# Patient Record
Sex: MALE | Race: WHITE | NOT HISPANIC OR LATINO | URBAN - METROPOLITAN AREA
[De-identification: names, ages, dates, MRNs, and addresses within clinical notes are randomized per-mention and may not be internally consistent; named-entity substitution may affect disease eponyms.]

---

## 2018-01-26 ENCOUNTER — IMPORTED ENCOUNTER (OUTPATIENT)
Dept: URBAN - METROPOLITAN AREA CLINIC 50 | Facility: CLINIC | Age: 78
End: 2018-01-26

## 2018-03-27 ENCOUNTER — IMPORTED ENCOUNTER (OUTPATIENT)
Dept: URBAN - METROPOLITAN AREA CLINIC 50 | Facility: CLINIC | Age: 78
End: 2018-03-27

## 2018-03-27 NOTE — PATIENT DISCUSSION
"""Informed patient that their cataract OS is visually significant and meets the criteria for cataract surgery to increase their vision and decrease their glare symptoms.  Order scans for IOL measurements

## 2019-07-18 NOTE — PROCEDURE NOTE: CLINICAL
PROCEDURE NOTE: Focal Laser, Retina #1 OD. Diagnosis: Diabetic Macular Edema. Anesthesia: Topical. Prior to focal laser, risks/benefits/alternatives to laser discussed including loss of vision and patient wished to proceed. An informed consent was obtained and no assurances or guarantees were given. Spot size: 100* um. Power: 90* mW. Number of pulses: 31*. Pulse duration: 70* ms. Procedure Time:956AM *. Patient tolerated procedure well. There were no complications. Post procedure instructions given. Patient given office phone number/answering service number and advised to call immediately should there be loss of vision or pain, or should they have any other questions or concerns. Lydia Barr

## 2019-08-09 NOTE — PROCEDURE NOTE: CLINICAL
PROCEDURE NOTE: Focal Laser, Retina OS. Diagnosis: Diabetic Macular Edema. Anesthesia: Topical. Prior to focal laser, risks/benefits/alternatives to laser discussed including loss of vision and patient wished to proceed. An informed consent was obtained and no assurances or guarantees were given. Spot size: 100* um. Power: 60* mW. Number of pulses: 57*. Pulse duration: 70* ms. Procedure Time: 211PM*. Patient tolerated procedure well. There were no complications. Post procedure instructions given. Patient given office phone number/answering service number and advised to call immediately should there be loss of vision or pain, or should they have any other questions or concerns. Veronica Stern

## 2020-11-20 NOTE — PATIENT DISCUSSION
11/20/20 INCREASED IOP, ON ALPHAGAN TID, , TIMOLOL/DORZOLAMIDE TID, WILL ADD JANE LARSON , SEE COAG SPECIALIST.

## 2020-12-04 NOTE — PATIENT DISCUSSION
- New CRx dispensed today to use full time for reading.  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

## 2021-01-15 NOTE — PROCEDURE NOTE: CLINICAL
PROCEDURE NOTE: Avastin 1.25mg #1 OD. Diagnosis: Diabetic Macular Edema. Anesthesia: Subconjunctival. Prep: Betadine Drops. Prior to injection, risks/benefits/alternatives discussed including infection, loss of vision, hemorrhage, cataract, glaucoma, retinal tears or detachment. The off-label status of Intravitreal Avastin also was reviewed. The patient wished to proceed with treatment. Topical anesthesia was induced with Alcaine. Additional anesthesia was achieved using drop(s) or injection checked above. A drop of Povidone-iodine 5% ophthalmic solution was instilled over the injection site and in the inferior fornix. Betadine prep was performed. Using the syringe provided, Avastin 1.25 mg in 0.05 cc was injected into the vitreous cavity. The needle was passed 3.0 mm posterior to the limbus in pseudophakic patients, and 3.5 mm posterior to the limbus in phakic patients. Patient tolerated procedure well. There were no complications. Injection time: 5:25PM. Lot #: [id number]. Expiration Date: 3/13/2021. Post-op instructions given. Inventory Id: null. The patient was instructed to return for re-evaluation in approximately 4-12 weeks depending on his/her condition and was told to call immediately if vision decreases and/or if his/her eye becomes red, painful, and/or light sensitive. The patient was instructed to go to the emergency room or call 911 if unable to reach the doctor within an hour or two of trying or calling. The patient was instructed to use Artificial Tears q.i.d. p.r.n for comfort. Castillo Lazo

## 2021-02-19 NOTE — PROCEDURE NOTE: CLINICAL
PROCEDURE NOTE: Avastin 1.25mg OD. Diagnosis: Diabetic Macular Edema. Anesthesia: Subconjunctival. Prep: Betadine Drops. Prior to injection, risks/benefits/alternatives discussed including infection, loss of vision, hemorrhage, cataract, glaucoma, retinal tears or detachment. The off-label status of Intravitreal Avastin also was reviewed. The patient wished to proceed with treatment. Topical anesthesia was induced with Alcaine. Additional anesthesia was achieved using drop(s) or injection checked above. A drop of Povidone-iodine 5% ophthalmic solution was instilled over the injection site and in the inferior fornix. Betadine prep was performed. Using the syringe provided, Avastin 1.25 mg in 0.05 cc was injected into the vitreous cavity. The needle was passed 3.0 mm posterior to the limbus in pseudophakic patients, and 3.5 mm posterior to the limbus in phakic patients. Patient tolerated procedure well. There were no complications. Injection time: *. Lot #: [email or web address]. Expiration Date: 4/6/2021. Post-op instructions given. Inventory Id: null. The patient was instructed to return for re-evaluation in approximately 4-12 weeks depending on his/her condition and was told to call immediately if vision decreases and/or if his/her eye becomes red, painful, and/or light sensitive. The patient was instructed to go to the emergency room or call 911 if unable to reach the doctor within an hour or two of trying or calling. The patient was instructed to use Artificial Tears q.i.d. p.r.n for comfort. [facility]

## 2021-02-19 NOTE — PATIENT DISCUSSION
After Visit Summary   11/17/2017    Amna Hickman    MRN: [id number]           Patient Information     Date Of Birth          1956        Visit Information        Provider Department      11/17/2017 12:30 PM Wilber Greer MD [facility]        Today's Diagnoses     Need for prophylactic vaccination and inoculation against influenza    -  1    Routine general medical examination at a health care facility        Nonintractable migraine, unspecified migraine type          Care Instructions      Preventive Health Recommendations  Female Ages 50 - 64    Yearly exam: See your health care provider every year in order to  o Review health changes.   o Discuss preventive care.    o Review your medicines if your doctor has prescribed any.      Get a Pap test every three years (unless you have an abnormal result and your provider advises testing more often).    If you get Pap tests with HPV test, you only need to test every 5 years, unless you have an abnormal result.     You do not need a Pap test if your uterus was removed (hysterectomy) and you have not had cancer.    You should be tested each year for STDs (sexually transmitted diseases) if you're at risk.     Have a mammogram every 1 to 2 years.    Have a colonoscopy at age 50, or have a yearly FIT test (stool test). These exams screen for colon cancer.      Have a cholesterol test every 5 years, or more often if advised.    Have a diabetes test (fasting glucose) every three years. If you are at risk for diabetes, you should have this test more often.     If you are at risk for osteoporosis (brittle bone disease), think about having a bone density scan (DEXA).    Shots: Get a flu shot each year. Get a tetanus shot every 10 years.    Nutrition:     Eat at least 5 servings of fruits and vegetables each day.    Eat whole-grain bread, whole-wheat pasta and brown rice instead of white grains and rice.    Talk to your provider about  ARTIFICIAL TEARS to affected eye(s) as needed. "Calcium and Vitamin D.     Lifestyle    Exercise at least 150 minutes a week (30 minutes a day, 5 days a week). This will help you control your weight and prevent disease.    Limit alcohol to one drink per day.    No smoking.     Wear sunscreen to prevent skin cancer.     See your dentist every six months for an exam and cleaning.    See your eye doctor every 1 to 2 years.            Follow-ups after your visit        Who to contact     If you have questions or need follow up information about today's clinic visit or your schedule please contact [facility] directly at [phone].  Normal or non-critical lab and imaging results will be communicated to you by MyQuoteApphart, letter or phone within 4 business days after the clinic has received the results. If you do not hear from us within 7 days, please contact the clinic through [email or web address] or phone. If you have a critical or abnormal lab result, we will notify you by phone as soon as possible.  Submit refill requests through [facility]ik or call your pharmacy and they will forward the refill request to us. Please allow 3 business days for your refill to be completed.          Additional Information About Your Visit        MyChart Information     [facility]ik lets you send messages to your doctor, view your test results, renew your prescriptions, schedule appointments and more. To sign up, go to [email or web address] . Click on \"Log in\" on the left side of the screen, which will take you to the Welcome page. Then click on \"Sign up Now\" on the right side of the page.     You will be asked to enter the access code listed below, as well as some personal information. Please follow the directions to create your username and password.     Your access code is: [email or web address]  Expires: 2/15/2018 12:38 PM     Your access code will  in 90 days. If you need help or a new code, please call your [facility] or [phone].        Care EveryWhere ID     This is your Care " "EveryWhere ID. This could be used by other organizations to access your [facility] medical records  [id number]        Your Vitals Were     Pulse Temperature Height Pulse Oximetry Breastfeeding? BMI (Body Mass Index)    57 97  F (36.1  C) (Oral) 5' 2.5\" (1.588 m) 98% No 22.68 kg/m2       Blood Pressure from Last 3 Encounters:   11/17/17 118/73   11/04/16 112/72   08/04/15 116/68    Weight from Last 3 Encounters:   11/17/17 126 lb (57.2 kg)   11/04/16 122 lb 4.8 oz (55.5 kg)   08/04/15 120 lb 6.4 oz (54.6 kg)              We Performed the Following     FLU VAC, SPLIT VIRUS IM > 3 YO (QUADRIVALENT) [55015]     Vaccine Administration, Initial [85904]          Today's Medication Changes          These changes are accurate as of: 11/17/17 12:38 PM.  If you have any questions, ask your nurse or doctor.               Stop taking these medicines if you haven't already. Please contact your care team if you have questions.     estrogens conjugated (synthetic A) 0.3 MG tablet   Commonly known as:  CENESTIN   Stopped by:  Wilber Greer MD           VIVELLE-DOT 0.1 MG/24HR BIW patch   Generic drug:  estradiol   Stopped by:  Wilber Greer MD                Where to get your medicines      These medications were sent to [facility] [address] [place] [address] & Market  [address] [place] [id number]     Phone:  [phone]     frovatriptan 2.5 MG tablet                Primary Care Provider Office Phone # Fax #    Wilber Greer [phone] [address] [address]        Equal Access to Services     [facility] : Gabo Soto, waaxda luqadaha, qaybta kaalmada bárbara, eunice falcon. [facility] [phone].    ATENCIÓN: Si habla español, tiene a levine disposición servicios gratuitos de asistencia lingüística. Llame al [phone].    We comply with applicable federal civil rights " laws and [place] laws. We do not discriminate on the basis of race, color, national origin, age, disability, sex, sexual orientation, or gender identity.            Thank you!     Thank you for choosing [facility]  for your care. Our goal is always to provide you with excellent care. Hearing back from our patients is one way we can continue to improve our services. Please take a few minutes to complete the written survey that you may receive in the mail after your visit with us. Thank you!             Your Updated Medication List - Protect others around you: Learn how to safely use, store and throw away your medicines at www.disposemymeds.org.          This list is accurate as of: 11/17/17 12:38 PM.  Always use your most recent med list.                   Brand Name Dispense Instructions for use Diagnosis    frovatriptan 2.5 MG tablet    FROVA    21 tablet    Take 1 tablet (2.5 mg) by  mouth at onset of headache    Nonintractable migraine, unspecified migraine type       zolpidem 5 MG tablet    AMBIEN    15 tablet    Take 1 tablet (5 mg) by mouth nightly as needed for sleep    Transient insomnia

## 2021-04-17 ASSESSMENT — VISUAL ACUITY
OD_BAT: 20/30
OS_CC: J1
OD_OTHER: 20/30. 20/40.
OS_OTHER: 20/80.
OD_CC: 20/25
OD_CC: J1
OS_BAT: 20/80
OS_CC: 20/40

## 2021-04-17 ASSESSMENT — TONOMETRY
OD_IOP_MMHG: 15
OS_IOP_MMHG: 15

## 2021-04-20 NOTE — PROCEDURE NOTE: CLINICAL
PROCEDURE NOTE: Avastin 1.25mg OD. Diagnosis: Diabetic Macular Edema. Anesthesia: Lidocaine 4%. Prep: Betadine Drops. Prior to injection, risks/benefits/alternatives discussed including infection, loss of vision, hemorrhage, cataract, glaucoma, retinal tears or detachment. The off-label status of Intravitreal Avastin also was reviewed. The patient wished to proceed with treatment. Topical anesthesia was induced with Alcaine. Additional anesthesia was achieved using drop(s) or injection checked above. A drop of Povidone-iodine 5% ophthalmic solution was instilled over the injection site and in the inferior fornix. Betadine prep was performed. Using the syringe provided, Avastin 1.25 mg in 0.05 cc was injected into the vitreous cavity. The needle was passed 3.0 mm posterior to the limbus in pseudophakic patients, and 3.5 mm posterior to the limbus in phakic patients. Patient tolerated procedure well. There were no complications. Injection time: 340. Lot #: [email or web address]. Expiration Date: 7/1/2021. Post-op instructions given. Inventory Id: null. The patient was instructed to return for re-evaluation in approximately 4-12 weeks depending on his/her condition and was told to call immediately if vision decreases and/or if his/her eye becomes red, painful, and/or light sensitive. The patient was instructed to go to the emergency room or call 911 if unable to reach the doctor within an hour or two of trying or calling. The patient was instructed to use Artificial Tears q.i.d. p.r.n for comfort. Ambar Nicole

## 2021-09-16 NOTE — PROCEDURE NOTE: CLINICAL
PROCEDURE NOTE: Focal Laser, Retina OD. Diagnosis: Diabetic Macular Edema. Anesthesia: Topical. Prior to focal laser, risks/benefits/alternatives to laser discussed including loss of vision and patient wished to proceed. An informed consent was obtained and no assurances or guarantees were given. Spot size: 100* um. Power: 70* mW. Number of pulses:32 *. Pulse duration:80 * ms. Procedure Time: 116QH*. Patient tolerated procedure well. There were no complications. Post procedure instructions given. Patient given office phone number/answering service number and advised to call immediately should there be loss of vision or pain, or should they have any other questions or concerns. Alexandr Moss

## 2022-04-19 ENCOUNTER — NEW PATIENT (OUTPATIENT)
Dept: URBAN - METROPOLITAN AREA CLINIC 48 | Facility: CLINIC | Age: 82
End: 2022-04-19

## 2022-04-19 DIAGNOSIS — H25.812: ICD-10-CM

## 2022-04-19 DIAGNOSIS — H25.11: ICD-10-CM

## 2022-04-19 DIAGNOSIS — H35.371: ICD-10-CM

## 2022-04-19 PROCEDURE — 92004 COMPRE OPH EXAM NEW PT 1/>: CPT

## 2022-04-19 PROCEDURE — 92134 CPTRZ OPH DX IMG PST SGM RTA: CPT

## 2022-04-19 ASSESSMENT — VISUAL ACUITY
OD_GLARE: 20/200
OU_SC: J16
OS_SC: J18
OS_GLARE: 20/400
OD_SC: 20/200
OS_SC: 20/200-1
OD_PH: 20/60
OU_SC: 20/200
OD_GLARE: 20/50
OD_SC: J16

## 2022-04-19 ASSESSMENT — KERATOMETRY
OD_K2POWER_DIOPTERS: 41.25
OD_K1POWER_DIOPTERS: 40.00
OS_K1POWER_DIOPTERS: 40.75
OS_AXISANGLE_DEGREES: 50
OD_AXISANGLE2_DEGREES: 75
OS_K2POWER_DIOPTERS: 41.25
OD_AXISANGLE_DEGREES: 165
OS_AXISANGLE2_DEGREES: 140

## 2022-04-19 ASSESSMENT — TONOMETRY
OS_IOP_MMHG: 16
OD_IOP_MMHG: 16

## 2022-04-19 NOTE — PATIENT DISCUSSION
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 We discussed this and explained that the eye looks stable. The patient will continue regular medical care and management of cardiovascular status. The patient will call with any questions or problems.

## 2022-04-26 ENCOUNTER — DIAGNOSTICS ONLY (OUTPATIENT)
Dept: URBAN - METROPOLITAN AREA CLINIC 49 | Facility: CLINIC | Age: 82
End: 2022-04-26

## 2022-04-26 DIAGNOSIS — H25.813: ICD-10-CM

## 2022-04-26 PROCEDURE — 92025IOL CORNEAL TOPOGRAPHY PREMIUM IOL

## 2022-04-26 PROCEDURE — 92136 OPHTHALMIC BIOMETRY: CPT

## 2022-04-26 ASSESSMENT — KERATOMETRY
OD_AXISANGLE_DEGREES: 06
OS_AXISANGLE_DEGREES: 117
OS_K1POWER_DIOPTERS: 41.25
OS_K2POWER_DIOPTERS: 41.00
OD_K2POWER_DIOPTERS: 40.25
OD_K1POWER_DIOPTERS: 41.75
OD_AXISANGLE2_DEGREES: 96
OS_AXISANGLE2_DEGREES: 27

## 2022-05-10 ENCOUNTER — PRE-OP/H&P (OUTPATIENT)
Dept: URBAN - METROPOLITAN AREA CLINIC 49 | Facility: CLINIC | Age: 82
End: 2022-05-10

## 2022-05-10 DIAGNOSIS — H25.812: ICD-10-CM

## 2022-05-10 PROCEDURE — PREOP PRE OP VISIT

## 2022-05-10 ASSESSMENT — TONOMETRY
OS_IOP_MMHG: 16
OD_IOP_MMHG: 15

## 2022-05-10 ASSESSMENT — VISUAL ACUITY
OD_CC: 20/40
OS_CC: 20/100

## 2022-05-10 NOTE — PATIENT DISCUSSION
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 Appears stable. The patient will continue regular medical care and management of cardiovascular status. Patient understands limited visual potential secondary to history of CRAO.

## 2022-05-10 NOTE — PATIENT DISCUSSION
Long discussion about the benefits of Femtosecond Laser and having cataract pre-cut. Recommend Laser secondary to patient being monocular. Will recommend patient still wear glasses after cataract surgery to protect right eye at all times. Patient would like to think about Laser vs non Laser  and will make decision at OD pre-op.

## 2022-05-10 NOTE — PATIENT DISCUSSION
CATARACT SURGERY PLANNER - STANDARD IOL/NO FEMTO: Phacoemulsification with IOL: Eye: OS|DOS: 5/19/2022|Model: AABOO|Power: 22. 0|Target: PLANO|Visc: DUET|Omidria: YES|10% Phenylephrine: YES|Epi-shugarcaine: YES|Phaco Setting: STD|Olive Tip: +/-|Notes: Plan: AABOO Target PLANO OS; OD TBD @ 1 week PO OS. Hx: h/o CRAO OS-stable; ERM OD. DILATES to 7MM.

## 2022-08-03 NOTE — PROCEDURE NOTE: CLINICAL
PROCEDURE NOTE: Focal Laser, Retina OD. Diagnosis: Diabetic Macular Edema. Anesthesia: Topical. Prior to focal laser, risks/benefits/alternatives to laser discussed including loss of vision and patient wished to proceed. An informed consent was obtained and no assurances or guarantees were given. Spot size: 50 um. Power: *110 mW. Number of pulses: 25 . Pulse duration: 50 ms. Procedure Time: 12:09 PM. Patient tolerated procedure well. There were no complications. Post procedure instructions given. Patient given office phone number/answering service number and advised to call immediately should there be loss of vision or pain, or should they have any other questions or concerns. Nidia Yo

## 2022-11-01 ENCOUNTER — PRE-OP/H&P (OUTPATIENT)
Dept: URBAN - METROPOLITAN AREA CLINIC 49 | Facility: CLINIC | Age: 82
End: 2022-11-01

## 2022-11-01 DIAGNOSIS — H35.371: ICD-10-CM

## 2022-11-01 DIAGNOSIS — H25.813: ICD-10-CM

## 2022-11-01 DIAGNOSIS — H25.11: ICD-10-CM

## 2022-11-01 PROCEDURE — 92015 DETERMINE REFRACTIVE STATE: CPT

## 2022-11-01 PROCEDURE — 92136 OPHTHALMIC BIOMETRY: CPT

## 2022-11-01 PROCEDURE — PREOP PRE OP VISIT

## 2022-11-01 ASSESSMENT — KERATOMETRY
OS_K1POWER_DIOPTERS: 39.75
OD_AXISANGLE_DEGREES: 76
OD_K1POWER_DIOPTERS: 41.12
OS_AXISANGLE_DEGREES: 104
OD_AXISANGLE2_DEGREES: 166
OS_AXISANGLE2_DEGREES: 14
OD_K2POWER_DIOPTERS: 39.87
OS_K2POWER_DIOPTERS: 39.50

## 2022-11-01 ASSESSMENT — VISUAL ACUITY
OD_GLARE: 20/70
OS_CC: 20/150
OD_GLARE: 20/200
OS_GLARE: 20/400
OD_CC: 20/50

## 2022-11-01 ASSESSMENT — TONOMETRY
OD_IOP_MMHG: 16
OS_IOP_MMHG: 15

## 2022-11-01 NOTE — PATIENT DISCUSSION
Biometry notes- repeat biometry today previous scans done greater than 6 months ago. Pt would like BASIC OU.

## 2022-11-10 ENCOUNTER — POST-OP (OUTPATIENT)
Dept: URBAN - METROPOLITAN AREA SURGERY 16 | Facility: SURGERY | Age: 82
End: 2022-11-10

## 2022-11-10 ENCOUNTER — SURGERY/PROCEDURE (OUTPATIENT)
Dept: URBAN - METROPOLITAN AREA SURGERY 16 | Facility: SURGERY | Age: 82
End: 2022-11-10

## 2022-11-10 DIAGNOSIS — Z96.1: ICD-10-CM

## 2022-11-10 DIAGNOSIS — Z98.42: ICD-10-CM

## 2022-11-10 DIAGNOSIS — H25.812: ICD-10-CM

## 2022-11-10 PROCEDURE — 66984 XCAPSL CTRC RMVL W/O ECP: CPT

## 2022-11-10 PROCEDURE — 99024 POSTOP FOLLOW-UP VISIT: CPT

## 2022-11-10 ASSESSMENT — KERATOMETRY
OD_AXISANGLE_DEGREES: 76
OD_K2POWER_DIOPTERS: 39.87
OD_K2POWER_DIOPTERS: 39.87
OS_K2POWER_DIOPTERS: 39.50
OD_AXISANGLE2_DEGREES: 166
OS_AXISANGLE2_DEGREES: 14
OS_K2POWER_DIOPTERS: 39.50
OD_AXISANGLE_DEGREES: 76
OS_AXISANGLE2_DEGREES: 14
OS_AXISANGLE_DEGREES: 104
OD_K1POWER_DIOPTERS: 41.12
OS_K1POWER_DIOPTERS: 39.75
OS_K1POWER_DIOPTERS: 39.75
OD_AXISANGLE2_DEGREES: 166
OD_K1POWER_DIOPTERS: 41.12
OS_AXISANGLE_DEGREES: 104

## 2022-11-10 ASSESSMENT — TONOMETRY: OS_IOP_MMHG: 16

## 2022-11-10 ASSESSMENT — VISUAL ACUITY: OS_SC: 20/100-1

## 2022-11-15 ENCOUNTER — POST OP/EVAL OF SECOND EYE (OUTPATIENT)
Dept: URBAN - METROPOLITAN AREA CLINIC 49 | Facility: CLINIC | Age: 82
End: 2022-11-15

## 2022-11-15 DIAGNOSIS — Z98.42: ICD-10-CM

## 2022-11-15 DIAGNOSIS — H25.11: ICD-10-CM

## 2022-11-15 PROCEDURE — 92136 - 2N OPHTHALMIC BIOMETRY BY PARTIAL COHERENCE INTERFEROMETRY WITH INTRAOCULAR LENS POWER CALCULATION

## 2022-11-15 PROCEDURE — 99213 OFFICE O/P EST LOW 20 MIN: CPT

## 2022-11-15 ASSESSMENT — VISUAL ACUITY
OD_PH: 20/40
OD_CC: 20/50
OS_SC: 20/150
OS_PH: 20/100
OD_GLARE: 20/200
OD_GLARE: 20/70

## 2022-11-15 ASSESSMENT — TONOMETRY
OD_IOP_MMHG: 16
OS_IOP_MMHG: 20

## 2022-11-22 ENCOUNTER — SURGERY/PROCEDURE (OUTPATIENT)
Dept: URBAN - METROPOLITAN AREA SURGERY 16 | Facility: SURGERY | Age: 82
End: 2022-11-22

## 2022-11-22 ENCOUNTER — POST-OP (OUTPATIENT)
Dept: URBAN - METROPOLITAN AREA CLINIC 48 | Facility: LOCATION | Age: 82
End: 2022-11-22

## 2022-11-22 DIAGNOSIS — Z98.41: ICD-10-CM

## 2022-11-22 DIAGNOSIS — H25.11: ICD-10-CM

## 2022-11-22 PROCEDURE — 99024 POSTOP FOLLOW-UP VISIT: CPT

## 2022-11-22 PROCEDURE — 66984 XCAPSL CTRC RMVL W/O ECP: CPT

## 2022-11-22 ASSESSMENT — VISUAL ACUITY: OD_SC: 20/50-1

## 2022-11-22 ASSESSMENT — TONOMETRY: OD_IOP_MMHG: 18

## 2022-11-29 ENCOUNTER — POST-OP (OUTPATIENT)
Dept: URBAN - METROPOLITAN AREA CLINIC 48 | Facility: LOCATION | Age: 82
End: 2022-11-29

## 2022-11-29 DIAGNOSIS — Z98.41: ICD-10-CM

## 2022-11-29 DIAGNOSIS — Z96.1: ICD-10-CM

## 2022-11-29 PROCEDURE — 99024 POSTOP FOLLOW-UP VISIT: CPT

## 2022-11-29 ASSESSMENT — VISUAL ACUITY
OD_PH: 20/30-2
OS_SC: 20/100
OD_SC: 20/40

## 2022-11-29 ASSESSMENT — TONOMETRY
OS_IOP_MMHG: 16
OD_IOP_MMHG: 16

## 2022-12-20 ENCOUNTER — POST-OP (OUTPATIENT)
Dept: URBAN - METROPOLITAN AREA CLINIC 48 | Facility: LOCATION | Age: 82
End: 2022-12-20

## 2022-12-20 PROCEDURE — 92134 CPTRZ OPH DX IMG PST SGM RTA: CPT

## 2022-12-20 PROCEDURE — 99024 POSTOP FOLLOW-UP VISIT: CPT

## 2022-12-20 ASSESSMENT — VISUAL ACUITY
OS_PH: 20/100
OD_SC: 20/50+1
OD_PH: 20/40+1

## 2022-12-20 ASSESSMENT — TONOMETRY
OS_IOP_MMHG: 16
OD_IOP_MMHG: 16